# Patient Record
Sex: MALE | Race: WHITE | NOT HISPANIC OR LATINO | Employment: OTHER | URBAN - METROPOLITAN AREA
[De-identification: names, ages, dates, MRNs, and addresses within clinical notes are randomized per-mention and may not be internally consistent; named-entity substitution may affect disease eponyms.]

---

## 2017-02-06 ENCOUNTER — OFFICE VISIT (OUTPATIENT)
Dept: URGENT CARE | Facility: MEDICAL CENTER | Age: 62
End: 2017-02-06
Payer: COMMERCIAL

## 2017-02-06 PROCEDURE — 87430 STREP A AG IA: CPT

## 2017-02-06 PROCEDURE — 99203 OFFICE O/P NEW LOW 30 MIN: CPT

## 2018-01-20 ENCOUNTER — OFFICE VISIT (OUTPATIENT)
Dept: URGENT CARE | Facility: MEDICAL CENTER | Age: 63
End: 2018-01-20
Payer: COMMERCIAL

## 2018-01-20 LAB
BILIRUB UR QL STRIP: NEGATIVE
CLARITY UR: NORMAL
COLOR UR: YELLOW
GLUCOSE (HISTORICAL): NEGATIVE
HGB UR QL STRIP.AUTO: NEGATIVE
KETONES UR STRIP-MCNC: NEGATIVE MG/DL
LEUKOCYTE ESTERASE UR QL STRIP: NEGATIVE
NITRITE UR QL STRIP: NEGATIVE
PH UR STRIP.AUTO: 6 [PH]
PROT UR STRIP-MCNC: NEGATIVE MG/DL
SP GR UR STRIP.AUTO: 1.02
UROBILINOGEN UR QL STRIP.AUTO: 0.2

## 2018-01-20 PROCEDURE — 99213 OFFICE O/P EST LOW 20 MIN: CPT

## 2018-01-20 PROCEDURE — 81002 URINALYSIS NONAUTO W/O SCOPE: CPT

## 2018-01-22 NOTE — PROGRESS NOTES
Assessment   1  Abdominal pain, LLQ (left lower quadrant) (603 05) (R10 32)    Plan   Abdominal pain    · Urine Dip Non-Automated- POC; Status:Resulted - Requires Verification,Retrospective    By Protocol Authorization;   Done: [id number] 06:34PM    Discussion/Summary   Discussion Summary:    1  LLQ abdominal pain go directly to the ER for bloodwork, CT scan and further work-up directly to [facility] per your request     Understands and agrees with treatment plan: The treatment plan was reviewed with the patient/guardian  The patient/guardian understands and agrees with the treatment plan      Chief Complaint   1  Abdominal Pain  Chief Complaint Free Text Note Form: Pt with complaints of pain across lower abdominal area that goes around to back  for past 24 hours  Denies nausea, vomiting or diarrhea  Describes pain as cramping  Currently rates pain 1/10  History of Present Illness   HPI: The patient presents today for an evaluation of lower abdominal pain that started last night  The patient states that pain has been coming and going throughout the day  The patient states that the pain is not present currently and he rates his pain as a 0/10  The patient has not tried any medications at home  Patient has a normal bowel movement this morning  Hospital Based Practices Required Assessment:      Pain Assessment      the patient states they have pain  The pain is located in the lower abdominal pain  The patient describes the pain as dull  (on a scale of 0 to 10, the patient rates the pain at 1 )      Abuse And Domestic Violence Screen   Domestic violence screen not done today  Reason DV Screen not done: wife in room       Depression And Suicide Screen  Suicide screen not done today  -- Reason suicide screen not done: wife in room  Prefered Language is  english  Primary Language is  english  Review of Systems   Focused-Male:      Constitutional: no fever-- and-- no chills  Respiratory: no shortness of breath  Gastrointestinal: abdominal pain, but-- no nausea,-- no vomiting-- and-- no diarrhea  Genitourinary: no dysuria  ROS Reviewed:    ROS reviewed  Active Problems   1  Exposure to strep throat (V01 89) (Z20 818)    Past Medical History   1  History of hypertension (V12 59) (Z86 79)  Active Problems And Past Medical History Reviewed: The active problems and past medical history were reviewed and updated today  Family History   Family History Reviewed: The family history was reviewed and updated today  Social History    · No alcohol use   · Nonsmoker (V49 89) (Z78 9)  Social History Reviewed: The social history was reviewed and updated today  The social history was reviewed and is unchanged  Surgical History   Surgical History Reviewed: The surgical history was reviewed and updated today  Current Meds    1  Amlodipine Besy-Benazepril HCl - 5-10 MG Oral Capsule; Therapy: (Recorded:[id number]) to Recorded   2  Diovan 40 MG Oral Tablet; Therapy: (Recorded:[id number]) to Recorded   3  Lovaza 1 GM Oral Capsule; Therapy: (Recorded:[id number]) to Recorded  Medication List Reviewed: The medication list was reviewed and updated today  Allergies   1  Penicillins    Vitals   Signs   Recorded: [id number] 06:28PM   Temperature: 97 F  Heart Rate: 60  Respiration: 16  Systolic: 030  Diastolic: 80  Height: 5 ft 10 in  Weight: 186 lb   BMI Calculated: 26 69  BSA Calculated: 2 02  O2 Saturation: 100  Pain Scale: 1    Physical Exam        Constitutional      General appearance: No acute distress, well appearing and well nourished  Pulmonary      Respiratory effort: No increased work of breathing or signs of respiratory distress  Auscultation of lungs: Clear to auscultation  Cardiovascular      Auscultation of heart: Normal rate and rhythm, normal S1 and S2, without murmurs         Abdomen      Abdomen: Abnormal   There was moderate tenderness in the suprapubic area-- and-- in the left lower quadrant  No rebound tenderness  No guarding        Musculoskeletal      Gait and station: Normal        Results/Data   Urine Dip Non-Automated- POC [id number] 06:34PM Anthony Powers      Test Name Result Flag Reference   Color Yellow     Clarity Transparent     Leukocytes negative     Nitrite negative     Blood negative     Bilirubin negative     Urobilinogen 0 2     Protein negative     Ph 6 0     Specific Gravity 1 020     Ketone negative     Glucose negative     Color Yellow     Clarity Transparent     Leukocytes negative     Nitrite negative     Blood negative     Bilirubin negative     Urobilinogen 0 2     Protein negative     Ph 6 0     Specific Gravity 1 020     Ketone negative     Glucose negative                Signatures    Electronically signed by : SNOW Reyes; Jan 20 2018  7:08PM EST                       (Author)     Electronically signed by : DAYANNA Parra ; Jan 21 2018  3:52PM EST

## 2021-02-09 ENCOUNTER — APPOINTMENT (OUTPATIENT)
Dept: RADIOLOGY | Facility: MEDICAL CENTER | Age: 66
End: 2021-02-09
Payer: MEDICARE

## 2021-02-09 ENCOUNTER — OFFICE VISIT (OUTPATIENT)
Dept: URGENT CARE | Facility: MEDICAL CENTER | Age: 66
End: 2021-02-09
Payer: MEDICARE

## 2021-02-09 VITALS
RESPIRATION RATE: 18 BRPM | DIASTOLIC BLOOD PRESSURE: 92 MMHG | HEART RATE: 57 BPM | TEMPERATURE: 97.5 F | OXYGEN SATURATION: 99 % | SYSTOLIC BLOOD PRESSURE: 137 MMHG

## 2021-02-09 DIAGNOSIS — M54.2 NECK PAIN: Primary | ICD-10-CM

## 2021-02-09 DIAGNOSIS — M25.511 ACUTE PAIN OF RIGHT SHOULDER: ICD-10-CM

## 2021-02-09 DIAGNOSIS — M25.512 ACUTE PAIN OF LEFT SHOULDER: ICD-10-CM

## 2021-02-09 DIAGNOSIS — M54.2 NECK PAIN: ICD-10-CM

## 2021-02-09 PROCEDURE — G0463 HOSPITAL OUTPT CLINIC VISIT: HCPCS | Performed by: FAMILY MEDICINE

## 2021-02-09 PROCEDURE — 73030 X-RAY EXAM OF SHOULDER: CPT

## 2021-02-09 PROCEDURE — 99213 OFFICE O/P EST LOW 20 MIN: CPT | Performed by: FAMILY MEDICINE

## 2021-02-09 PROCEDURE — 72040 X-RAY EXAM NECK SPINE 2-3 VW: CPT

## 2021-02-09 RX ORDER — ROSUVASTATIN CALCIUM 20 MG/1
20 TABLET, COATED ORAL DAILY
COMMUNITY
Start: 2020-09-30

## 2021-02-09 RX ORDER — TADALAFIL 10 MG/1
10 TABLET ORAL
COMMUNITY
End: 2021-07-26 | Stop reason: SDUPTHER

## 2021-02-09 RX ORDER — AMLODIPINE BESYLATE AND BENAZEPRIL HYDROCHLORIDE 5; 10 MG/1; MG/1
CAPSULE ORAL
COMMUNITY

## 2021-02-09 RX ORDER — FEXOFENADINE HCL 180 MG/1
180 TABLET ORAL DAILY
COMMUNITY

## 2021-02-09 RX ORDER — OMEGA-3-ACID ETHYL ESTERS 1 G/1
CAPSULE, LIQUID FILLED ORAL
COMMUNITY

## 2021-02-09 RX ORDER — METOPROLOL SUCCINATE 50 MG/1
50 TABLET, EXTENDED RELEASE ORAL
COMMUNITY
Start: 2020-08-21 | End: 2021-12-03

## 2021-02-09 RX ORDER — VALSARTAN 40 MG/1
TABLET ORAL
COMMUNITY

## 2021-02-09 NOTE — PROGRESS NOTES
[facility]        NAME: Hardik Mera is a 72 y o  male  : 1955    MRN: [id number]  DATE: 2021  TIME: 1:46 PM    Assessment and Plan   Neck pain [M54 2]  1  Neck pain     2  Acute pain of right shoulder     3  Acute pain of left shoulder       X-rays of right and left shoulder - No acute fractures or dislocations pending radiology report  X-rays of neck-no acute fractures  Osteoarthritis of cervical spine noted  Pending radiology report  Patient Instructions     Patient was educated on right and left shoulder pain  Patient was educated on taking tylenol for the pain  Patient was told if pain persist to follow up with PCP or orthopedic physician  Patient was told if he begins to have chest pain, shortness of breath or headaches to go to ED  Chief Complaint     Chief Complaint   Patient presents with    Shoulder Injury     Patient c/o bilateral shoulder and neck pain after he slipped shovling snow this morning  History of Present Illness       Patient is a 72year old male who was shoveling today when he slipped on ice and fell directly backwards  Patient reports after the fall he felt B/L shoulder pain and neck pain  Denies hitting his head or losing consciousness  Admits history of Right shoulder rotator cuff tendonitis and history of left shoulder dislocations 10 years ago  Denies any history of neck pain  Admits mild nausea and blurred vision  Denies chest pain, shortness of breath, and headache  Patient admits taking Percocet with minimal relief  Patient has not tried icing or using heat  Shoulder Injury         Review of Systems   Review of Systems   Constitutional: Negative  HENT: Negative  Eyes: Negative  Respiratory: Negative  Cardiovascular: Negative  Gastrointestinal: Negative  Genitourinary: Negative  Musculoskeletal: Positive for arthralgias, neck pain and neck stiffness  Right and left shoulder pain      Neck pain   Skin: Negative  Neurological: Positive for dizziness and headaches  Psychiatric/Behavioral: Negative  Current Medications       Current Outpatient Medications:     apixaban (ELIQUIS) 5 mg, Take 5 mg by mouth 2 (two) times a day, Disp: , Rfl:     metoprolol succinate (TOPROL-XL) 50 mg 24 hr tablet, Take 50 mg by mouth, Disp: , Rfl:     rosuvastatin (CRESTOR) 20 MG tablet, Take 20 mg by mouth daily, Disp: , Rfl:     amLODIPine-benazepril (LOTREL 5-10) 5-10 MG per capsule, Take by mouth, Disp: , Rfl:     fexofenadine (ALLEGRA) 180 MG tablet, Take 180 mg by mouth daily, Disp: , Rfl:     omega-3-acid ethyl esters (Lovaza) 1 g capsule, Take by mouth, Disp: , Rfl:     tadalafil (CIALIS) 10 MG tablet, Take 10 mg by mouth, Disp: , Rfl:     valsartan (Diovan) 40 mg tablet, Take by mouth, Disp: , Rfl:     Current Allergies     Allergies as of 02/09/2021 - never reviewed   Allergen Reaction Noted    Other Other (See Comments) 03/19/2015    Penicillins Other (See Comments) 11/23/2016            The following portions of the patient's history were reviewed and updated as appropriate: allergies, current medications, past family history, past medical history, past social history, past surgical history and problem list      No past medical history on file  No past surgical history on file  No family history on file  Medications have been verified  Objective   /92   Pulse 57   Temp 97 5 °F (36 4 °C)   Resp 18   SpO2 99%   No LMP for male patient  Physical Exam     Physical Exam  Constitutional:       Appearance: Normal appearance  He is normal weight  HENT:      Head: Normocephalic  Eyes:      Pupils: Pupils are equal, round, and reactive to light  Neck:      Musculoskeletal: Normal range of motion  Cardiovascular:      Rate and Rhythm: Normal rate and regular rhythm  Heart sounds: Normal heart sounds     Pulmonary:      Effort: Pulmonary effort is normal       Breath sounds: Normal breath sounds  Abdominal:      Palpations: Abdomen is soft  Musculoskeletal:      Comments: Significantly limited neck range of motion  Pain over right clavicle  No pain over left clavicle  No pain over left shoulder joint  Mild pain over right shoulder joint  Right shoulder range of motion decreased and limited due to pain  Left shoulder pain intact  Positive Empty can test in right shoulder  Negative empty can test in left shoulder  No pain over left and right elbow joint  NO pain with resisted left and right elbow flexion and extension   strength intact in B/L hand  NO pain over cervical, thoracic or lumbar spine  Pain with palpation over right paraspinals  Patient is neurovascularly intact  NO pain with rib palpation  Skin:     Comments: NO abrasion noted over B/L clavicles    Neurological:      Mental Status: He is alert and oriented to person, place, and time     Psychiatric:         Mood and Affect: Mood normal          Behavior: Behavior normal

## 2021-02-09 NOTE — PATIENT INSTRUCTIONS
Patient was educated on right and left shoulder pain  Patient was educated on to take tylenol for the pain  Patient was educated to take anti-inflammatories with food  Patient was told if pain persist to follow up with PCP or orthopedic physician  Patient was told if he begins to have chest pain, shortness of breath or headaches to go to ED  Neck Pain   WHAT YOU NEED TO KNOW:   You may have sudden neck pain that increases quickly  You may instead feel pain build slowly over time  Neck pain may go away in a few days or weeks, or it may continue for months  The pain may come and go, or be worse with certain movements  The pain may be only in your neck, or it may move to your arms, back, or shoulders  You may also have pain that starts in another body area and moves to your neck  Some types of neck pain are permanent  DISCHARGE INSTRUCTIONS:   Return to the emergency department if:   · You have an injury that causes neck pain and shooting pain down your arms or legs  · Your neck pain suddenly becomes severe  · You have neck pain along with numbness, tingling, or weakness in your arms or legs  · You have a stiff neck, a headache, and a fever  Contact your healthcare provider if:   · You have new or worsening symptoms  · Your symptoms continue even after treatment  · You have questions or concerns about your condition or care  Medicines: You may need any of the following:  · NSAIDs  , such as ibuprofen, help decrease swelling, pain, and fever  This medicine is available without a doctor's order  Ask your healthcare provider which medicine to take and how often to take it  Follow directions  NSAIDs can cause stomach bleeding or kidney problems if not taken correctly  If you take blood thinner medicine, always ask if NSAIDs are safe for you  · Acetaminophen  helps decrease pain and fever  Ask your healthcare provider how much to take and how often to take it  Follow directions  Acetaminophen can cause liver damage if not taken correctly  · Steroid medicine  may be used to reduce inflammation  This can help relieve pain caused by swelling  · Take your medicine as directed  Contact your healthcare provider if you think your medicine is not helping or if you have side effects  Tell him or her if you are allergic to any medicine  Keep a list of the medicines, vitamins, and herbs you take  Include the amounts, and when and why you take them  Bring the list or the pill bottles to follow-up visits  Carry your medicine list with you in case of an emergency  Manage or prevent neck pain:   · Rest your neck as directed  Do not make sudden movements, such as turning your head quickly  Your healthcare provider may recommend you wear a cervical collar for a short time  The collar will prevent you from moving your head  This will give your neck time to heal if an injury is causing your neck pain  Ask your healthcare provider when you can return to sports or other normal daily activities  · Apply heat as directed  Heat helps relieve pain and swelling  Use a heat wrap, or soak a small towel in warm water  Wring out the extra water  Apply the heat wrap or towel for 20 minutes every hour, or as directed  · Apply ice as directed  Ice helps relieve pain and swelling, and can help prevent tissue damage  Use an ice pack, or put ice in a bag  Cover the ice pack or back with a towel before you apply it to your neck  Apply the ice pack or ice for 15 minutes every hour, or as directed  Your healthcare provider can tell you how often to apply ice  · Do neck exercises as directed  Neck exercises help strengthen the muscles and increase range of motion  Your healthcare provider will tell you which exercises are right for you   He may give you instructions, or he may recommend that you work with a physical therapist  Your healthcare provider or therapist can make sure you are doing the exercises correctly  · Maintain good posture  Try to keep your head and shoulders lifted when you sit  If you work in front of a computer, make sure the monitor is at the right level  You should not need to look up down to see the screen  You should also not have to lean forward to be able to read what is on the screen  Make sure your keyboard, mouse, and other computer items are placed where you do not have to extend your shoulder to reach them  Get up often if you work in front of a computer or sit for long periods of time  Stretch or walk around to keep your neck muscles loose  Follow up with your healthcare provider as directed: Your healthcare provider may refer you to a specialist if your pain does not get better with treatment  Write down your questions so you remember to ask them during your visits  © Copyright [address] Information is for End User's use only and may not be sold, redistributed or otherwise used for commercial purposes  All illustrations and images included in CareNotes® are the copyrighted property of A D [employer] M , Inc  or [facility] [employer]   The above information is an  only  It is not intended as medical advice for individual conditions or treatments  Talk to your doctor, nurse or pharmacist before following any medical regimen to see if it is safe and effective for you  Shoulder Pain   WHAT YOU NEED TO KNOW:   Shoulder pain is a common problem that can affect your daily activities  Pain can be caused by a problem within your shoulder, such as soreness of a tendon or bursa  A tendon is a cord of tough tissue that connects your muscles to your bones  The bursa is a fluid-filled sac that acts as a cushion between a bone and a tendon  Shoulder pain may also be caused by pain that spreads to your shoulder from another part of your body  DISCHARGE INSTRUCTIONS:   Return to the emergency department if:   · You have severe pain      · You cannot move your arm or shoulder  · You have numbness or tingling in your shoulder or arm  Contact your healthcare provider if:   · Your pain gets worse or does not go away with treatment  · You have trouble moving your arm or shoulder  · You have questions or concerns about your condition or care  Medicines: You may need any of the following:  · Acetaminophen  decreases pain and fever  It is available without a doctor's order  Ask how much to take and how often to take it  Follow directions  Read the labels of all other medicines you are using to see if they also contain acetaminophen, or ask your doctor or pharmacist  Acetaminophen can cause liver damage if not taken correctly  Do not use more than 4 grams (4,000 milligrams) total of acetaminophen in one day  · NSAIDs , such as ibuprofen, help decrease swelling, pain, and fever  This medicine is available with or without a doctor's order  NSAIDs can cause stomach bleeding or kidney problems in certain people  If you take blood thinner medicine, always ask your healthcare provider if NSAIDs are safe for you  Always read the medicine label and follow directions  · Take your medicine as directed  Contact your healthcare provider if you think your medicine is not helping or if you have side effects  Tell him of her if you are allergic to any medicine  Keep a list of the medicines, vitamins, and herbs you take  Include the amounts, and when and why you take them  Bring the list or the pill bottles to follow-up visits  Carry your medicine list with you in case of an emergency  Manage your symptoms:   · Apply ice  on your shoulder for 20 to 30 minutes every 2 hours or as directed  Use an ice pack, or put crushed ice in a plastic bag  Cover it with a towel before you apply it to your shoulder  Ice helps prevent tissue damage and decreases swelling and pain  · Apply heat if ice does not help your symptoms    Apply heat on your shoulder for 20 to 30 minutes every 2 hours for as many days as directed  Heat helps decrease pain and muscle spasms  · Limit activities as directed  Try to avoid repeated overhead movements  · Go to physical or occupational therapy as directed  A physical therapist teaches you exercises to help improve movement and strength, and to decrease pain  An occupational therapist teaches you skills to help with your daily activities  Prevent shoulder pain:   · Maintain a good range of motion in your shoulder  Ask your healthcare provider which exercises you should do on a regular basis after you have healed  · Stretch and strengthen your shoulder  Use proper technique during exercises and sports  Follow up with your healthcare provider or orthopedist as directed:  Write down your questions so you remember to ask them during your visits  © Copyright [address] Information is for End User's use only and may not be sold, redistributed or otherwise used for commercial purposes  All illustrations and images included in CareNotes® are the copyrighted property of [employer] D [employer] M , Inc  or [facility] [employer]   The above information is an  only  It is not intended as medical advice for individual conditions or treatments  Talk to your doctor, nurse or pharmacist before following any medical regimen to see if it is safe and effective for you

## 2021-03-01 ENCOUNTER — OFFICE VISIT (OUTPATIENT)
Dept: URGENT CARE | Facility: MEDICAL CENTER | Age: 66
End: 2021-03-01
Payer: MEDICARE

## 2021-03-01 VITALS
RESPIRATION RATE: 18 BRPM | HEIGHT: 68 IN | WEIGHT: 185 LBS | HEART RATE: 68 BPM | OXYGEN SATURATION: 98 % | TEMPERATURE: 96.4 F | BODY MASS INDEX: 28.04 KG/M2

## 2021-03-01 DIAGNOSIS — J02.9 PHARYNGITIS, UNSPECIFIED ETIOLOGY: Primary | ICD-10-CM

## 2021-03-01 DIAGNOSIS — Z20.822 ENCOUNTER FOR LABORATORY TESTING FOR COVID-19 VIRUS: ICD-10-CM

## 2021-03-01 LAB
FLUAV RNA NPH QL NAA+PROBE: NORMAL
FLUBV RNA NPH QL NAA+PROBE: NORMAL
RSV RNA NPH QL NAA+PROBE: NORMAL
S PYO AG THROAT QL: NEGATIVE

## 2021-03-01 PROCEDURE — G0463 HOSPITAL OUTPT CLINIC VISIT: HCPCS | Performed by: PHYSICIAN ASSISTANT

## 2021-03-01 PROCEDURE — 87880 STREP A ASSAY W/OPTIC: CPT | Performed by: PHYSICIAN ASSISTANT

## 2021-03-01 PROCEDURE — U0005 INFEC AGEN DETEC AMPLI PROBE: HCPCS | Performed by: PHYSICIAN ASSISTANT

## 2021-03-01 PROCEDURE — 87070 CULTURE OTHR SPECIMN AEROBIC: CPT | Performed by: PHYSICIAN ASSISTANT

## 2021-03-01 PROCEDURE — U0003 INFECTIOUS AGENT DETECTION BY NUCLEIC ACID (DNA OR RNA); SEVERE ACUTE RESPIRATORY SYNDROME CORONAVIRUS 2 (SARS-COV-2) (CORONAVIRUS DISEASE [COVID-19]), AMPLIFIED PROBE TECHNIQUE, MAKING USE OF HIGH THROUGHPUT TECHNOLOGIES AS DESCRIBED BY CMS-2020-01-R: HCPCS | Performed by: PHYSICIAN ASSISTANT

## 2021-03-01 PROCEDURE — 99212 OFFICE O/P EST SF 10 MIN: CPT | Performed by: PHYSICIAN ASSISTANT

## 2021-03-01 PROCEDURE — 87631 RESP VIRUS 3-5 TARGETS: CPT | Performed by: PHYSICIAN ASSISTANT

## 2021-03-01 RX ORDER — CLINDAMYCIN HYDROCHLORIDE 300 MG/1
300 CAPSULE ORAL 4 TIMES DAILY
Qty: 40 CAPSULE | Refills: 0 | Status: SHIPPED | OUTPATIENT
Start: 2021-03-01 | End: 2021-03-11

## 2021-03-01 NOTE — PATIENT INSTRUCTIONS

## 2021-03-02 LAB — SARS-COV-2 RNA RESP QL NAA+PROBE: NEGATIVE

## 2021-03-03 LAB — BACTERIA THROAT CULT: NORMAL

## 2021-07-15 ENCOUNTER — OFFICE VISIT (OUTPATIENT)
Dept: URGENT CARE | Facility: MEDICAL CENTER | Age: 66
End: 2021-07-15
Payer: MEDICARE

## 2021-07-15 VITALS
RESPIRATION RATE: 16 BRPM | OXYGEN SATURATION: 97 % | TEMPERATURE: 96.8 F | SYSTOLIC BLOOD PRESSURE: 182 MMHG | DIASTOLIC BLOOD PRESSURE: 84 MMHG | HEART RATE: 58 BPM

## 2021-07-15 DIAGNOSIS — R30.9 PAINFUL URINATION: Primary | ICD-10-CM

## 2021-07-15 DIAGNOSIS — B35.6 TINEA CRURIS: ICD-10-CM

## 2021-07-15 LAB
SL AMB  POCT GLUCOSE, UA: NEGATIVE
SL AMB LEUKOCYTE ESTERASE,UA: NEGATIVE
SL AMB POCT BILIRUBIN,UA: NEGATIVE
SL AMB POCT BLOOD,UA: NEGATIVE
SL AMB POCT CLARITY,UA: CLEAR
SL AMB POCT COLOR,UA: YELLOW
SL AMB POCT KETONES,UA: NEGATIVE
SL AMB POCT NITRITE,UA: NEGATIVE
SL AMB POCT PH,UA: 5
SL AMB POCT SPECIFIC GRAVITY,UA: 1.02
SL AMB POCT URINE PROTEIN: NEGATIVE
SL AMB POCT UROBILINOGEN: 0.2

## 2021-07-15 PROCEDURE — 81002 URINALYSIS NONAUTO W/O SCOPE: CPT | Performed by: FAMILY MEDICINE

## 2021-07-15 PROCEDURE — G0463 HOSPITAL OUTPT CLINIC VISIT: HCPCS | Performed by: FAMILY MEDICINE

## 2021-07-15 PROCEDURE — 99213 OFFICE O/P EST LOW 20 MIN: CPT | Performed by: FAMILY MEDICINE

## 2021-07-15 RX ORDER — TERBINAFINE HYDROCHLORIDE 250 MG/1
250 TABLET ORAL DAILY
Qty: 14 TABLET | Refills: 0 | Status: SHIPPED | OUTPATIENT
Start: 2021-07-15 | End: 2021-07-29

## 2021-07-15 RX ORDER — NYSTATIN 100000 U/G
CREAM TOPICAL 2 TIMES DAILY
COMMUNITY
End: 2021-12-03 | Stop reason: ALTCHOICE

## 2021-07-15 RX ORDER — OMEPRAZOLE 20 MG/1
20 CAPSULE, DELAYED RELEASE ORAL DAILY
COMMUNITY

## 2021-07-15 NOTE — PROGRESS NOTES
[facility]        NAME: Ann Marie Jimenez is a 72 y o  male  : 1955    MRN: [id number]  DATE: July 15, 2021  TIME: 11:18 AM    Assessment and Plan   Painful urination [R30 9]  1  Painful urination  POCT urine dip   2  Tinea cruris  terbinafine (LamISIL) 250 mg tablet         Patient Instructions       Follow up with PCP in 3-5 days  Proceed to  ER if symptoms worsen  Chief Complaint     Chief Complaint   Patient presents with    Possible UTI     Patient relates started with painful urination for 4 days  Denies fever and chills  Denies flank pain and abdominal pain  C/O "jock itch" due to heat and sweating  History of Present Illness        58-year-old male here today with complaint of painful urination for the past 4 days  Denies any  Pelvic pain  No fever  He has had previous UTIs in the past many years ago  He has a known history of BPH presently  However his main concern is he has fungal infection in the groin area which she has been treating with OTC anti fungals  He was recently seen by his PCP who prescribed as 10 cream which she has started last 3 day with minimal improvement  He has tried topical Lamisil in the past and also Lotrimin with no significant improvement  He describes some mild to moderate pruritus      Review of Systems   Review of Systems   Genitourinary: Positive for dysuria  Skin: Positive for rash           Current Medications       Current Outpatient Medications:     amLODIPine-benazepril (LOTREL 5-10) 5-10 MG per capsule, Take by mouth, Disp: , Rfl:     apixaban (ELIQUIS) 5 mg, Take 5 mg by mouth 2 (two) times a day, Disp: , Rfl:     fexofenadine (ALLEGRA) 180 MG tablet, Take 180 mg by mouth daily, Disp: , Rfl:     metoprolol succinate (TOPROL-XL) 50 mg 24 hr tablet, Take 50 mg by mouth, Disp: , Rfl:     nystatin (MYCOSTATIN) cream, Apply topically 2 (two) times a day, Disp: , Rfl:     omega-3-acid ethyl esters (Lovaza) 1 g capsule, Take by mouth, Disp: , Rfl:     omeprazole (PriLOSEC) 20 mg delayed release capsule, Take 20 mg by mouth daily, Disp: , Rfl:     rosuvastatin (CRESTOR) 20 MG tablet, Take 20 mg by mouth daily, Disp: , Rfl:     tadalafil (CIALIS) 10 MG tablet, Take 10 mg by mouth, Disp: , Rfl:     terbinafine (LamISIL) 250 mg tablet, Take 1 tablet (250 mg total) by mouth daily for 14 days, Disp: 14 tablet, Rfl: 0    valsartan (Diovan) 40 mg tablet, Take by mouth (Patient not taking: Reported on 7/15/2021), Disp: , Rfl:     Current Allergies     Allergies as of 07/15/2021 - Reviewed 07/15/2021   Allergen Reaction Noted    Other Other (See Comments) 03/19/2015    Penicillins Other (See Comments) 11/23/2016            The following portions of the patient's history were reviewed and updated as appropriate: allergies, current medications, past family history, past medical history, past social history, past surgical history and problem list      History reviewed  No pertinent past medical history  History reviewed  No pertinent surgical history  No family history on file  Medications have been verified  Objective   BP (!) 182/84   Pulse 58   Temp (!) 96 8 °F (36 °C) (Tympanic)   Resp 16   SpO2 97%   No LMP for male patient  Physical Exam     Physical Exam  Abdominal:      General: Abdomen is flat  Bowel sounds are normal    Genitourinary:     Penis: Normal        Testes: Normal    Skin:     Comments: Right and left inguinal area reveals scattered erythematous macular papular lesion with elevated border left greater than right    Findings are consistent with tinea cruris

## 2021-07-26 ENCOUNTER — OFFICE VISIT (OUTPATIENT)
Dept: UROLOGY | Facility: MEDICAL CENTER | Age: 66
End: 2021-07-26
Payer: MEDICARE

## 2021-07-26 VITALS
DIASTOLIC BLOOD PRESSURE: 82 MMHG | SYSTOLIC BLOOD PRESSURE: 146 MMHG | BODY MASS INDEX: 26.37 KG/M2 | HEIGHT: 68 IN | WEIGHT: 174 LBS

## 2021-07-26 DIAGNOSIS — N30.00 ACUTE CYSTITIS WITHOUT HEMATURIA: ICD-10-CM

## 2021-07-26 DIAGNOSIS — N28.9 RENAL INSUFFICIENCY: ICD-10-CM

## 2021-07-26 DIAGNOSIS — N13.8 BPH WITH URINARY OBSTRUCTION: ICD-10-CM

## 2021-07-26 DIAGNOSIS — R97.20 ELEVATED PROSTATE SPECIFIC ANTIGEN (PSA): Primary | ICD-10-CM

## 2021-07-26 DIAGNOSIS — N40.1 BPH WITH URINARY OBSTRUCTION: ICD-10-CM

## 2021-07-26 DIAGNOSIS — N52.8 MIXED ERECTILE DYSFUNCTION: ICD-10-CM

## 2021-07-26 PROCEDURE — 99204 OFFICE O/P NEW MOD 45 MIN: CPT | Performed by: UROLOGY

## 2021-07-26 RX ORDER — SULFAMETHOXAZOLE AND TRIMETHOPRIM 400; 80 MG/1; MG/1
TABLET ORAL 2 TIMES DAILY
COMMUNITY
Start: 2021-07-21 | End: 2021-07-28

## 2021-07-26 RX ORDER — TADALAFIL 10 MG/1
10 TABLET ORAL DAILY PRN
Qty: 30 TABLET | Refills: 3 | Status: SHIPPED | OUTPATIENT
Start: 2021-07-26

## 2021-07-26 RX ORDER — KETOCONAZOLE 20 MG/G
CREAM TOPICAL DAILY
COMMUNITY
End: 2021-12-03 | Stop reason: ALTCHOICE

## 2021-07-26 RX ORDER — DIPHENOXYLATE HYDROCHLORIDE AND ATROPINE SULFATE 2.5; .025 MG/1; MG/1
1 TABLET ORAL DAILY
COMMUNITY

## 2021-07-26 RX ORDER — SULFAMETHOXAZOLE AND TRIMETHOPRIM 800; 160 MG/1; MG/1
1 TABLET ORAL EVERY 12 HOURS SCHEDULED
Qty: 14 TABLET | Refills: 0 | Status: SHIPPED | OUTPATIENT
Start: 2021-07-26 | End: 2021-08-02

## 2021-07-26 RX ORDER — MELATONIN
1000 DAILY
COMMUNITY

## 2021-07-26 NOTE — PROGRESS NOTES
HISTORY:    Patient says he has had pain on urination, not really burning, for about two weeks or so  Feels this is symptoms of his UTI which has been recurrent over the years  He says about 6-8 infections over the past five years or so  Unclear if any cultures go along with this  Recent culture was mixed skin contaminant, treated over the phone by [facility] urology, and patient wanted another opinion and treatment  Minimal BPH symptoms, when he is not having infection, good stream and control rare nocturia  Chronic mild renal insufficiency    PSA 6 1 in March 2020         ASSESSMENT / PLAN:    He is five days into a seven day course of Bactrim they supplied  He does not think he has had enough symptom relief, and he wants to have further course of that on hand  At his request I gave him another week of Bactrim, says he will decide if he wants to take it based on how he feels in the next 2-3 days    He will do another urine culture 2-3 days after finishing the Bactrim    Should have cystoscopy to see if there is a reason why he has episodes of what he feels are recurrent UTI    He also wants a refill on Cialis, which I provided  PSA 6 1, will repeat again before our cystoscopy in about two months    The following portions of the patient's history were reviewed and updated as appropriate: allergies, current medications, past family history, past medical history, past social history, past surgical history and problem list     Review of Systems   All other systems reviewed and are negative  Objective:     Physical Exam  Constitutional:       General: He is not in acute distress  Appearance: He is well-developed  He is not diaphoretic  HENT:      Head: Normocephalic and atraumatic  Eyes:      General: No scleral icterus    Pulmonary:      Effort: Pulmonary effort is normal    Genitourinary:     Comments: Penis testes normal    Prostate minimally enlarged no nodules  Skin:     Coloration: Skin is not pale  Neurological:      Mental Status: He is alert and oriented to person, place, and time  Psychiatric:         Behavior: Behavior normal          Thought Content: Thought content normal          Judgment: Judgment normal            No results found for: PSA]  No results found for: BUN  No results found for: CREATININE  No components found for: CBC      There is no problem list on file for this patient  Diagnoses and all orders for this visit:    Elevated prostate specific antigen (PSA)  -     PSA Total, Diagnostic; Future    Renal insufficiency    BPH with urinary obstruction    Acute cystitis without hematuria  -     Urine culture; Future  -     sulfamethoxazole-trimethoprim (BACTRIM DS) 800-160 mg per tablet; Take 1 tablet by mouth every 12 (twelve) hours for 7 days    Mixed erectile dysfunction  -     tadalafil (CIALIS) 10 MG tablet; Take 1 tablet (10 mg total) by mouth daily as needed for erectile dysfunction    Other orders  -     sulfamethoxazole-trimethoprim (BACTRIM) 400-80 mg per tablet; Take by mouth 2 (two) times a day  -     Probiotic Product (PROBIOTIC ADVANCED PO); Take by mouth  -     cholecalciferol (VITAMIN D3) 1,000 units tablet; Take 1,000 Units by mouth daily  -     multivitamin (THERAGRAN) TABS; Take 1 tablet by mouth daily  -     ketoconazole (NIZORAL) 2 % cream; Apply topically daily           Patient ID: Vivien Fong is a 72 y o  male        Current Outpatient Medications:     sulfamethoxazole-trimethoprim (BACTRIM) 400-80 mg per tablet, Take by mouth 2 (two) times a day, Disp: , Rfl:     amLODIPine-benazepril (LOTREL 5-10) 5-10 MG per capsule, Take by mouth, Disp: , Rfl:     apixaban (ELIQUIS) 5 mg, Take 5 mg by mouth 2 (two) times a day, Disp: , Rfl:     fexofenadine (ALLEGRA) 180 MG tablet, Take 180 mg by mouth daily, Disp: , Rfl:     metoprolol succinate (TOPROL-XL) 50 mg 24 hr tablet, Take 50 mg by mouth, Disp: , Rfl:     nystatin (MYCOSTATIN) cream, Apply topically 2 (two) times a day, Disp: , Rfl:     omega-3-acid ethyl esters (Lovaza) 1 g capsule, Take by mouth, Disp: , Rfl:     omeprazole (PriLOSEC) 20 mg delayed release capsule, Take 20 mg by mouth daily, Disp: , Rfl:     rosuvastatin (CRESTOR) 20 MG tablet, Take 20 mg by mouth daily, Disp: , Rfl:     tadalafil (CIALIS) 10 MG tablet, Take 10 mg by mouth, Disp: , Rfl:     terbinafine (LamISIL) 250 mg tablet, Take 1 tablet (250 mg total) by mouth daily for 14 days, Disp: 14 tablet, Rfl: 0    valsartan (Diovan) 40 mg tablet, Take by mouth (Patient not taking: Reported on 7/15/2021), Disp: , Rfl:     No past medical history on file  No past surgical history on file      Social History

## 2021-08-13 ENCOUNTER — OFFICE VISIT (OUTPATIENT)
Dept: URGENT CARE | Facility: MEDICAL CENTER | Age: 66
End: 2021-08-13
Payer: MEDICARE

## 2021-08-13 ENCOUNTER — APPOINTMENT (OUTPATIENT)
Dept: LAB | Facility: MEDICAL CENTER | Age: 66
End: 2021-08-13
Attending: UROLOGY
Payer: MEDICARE

## 2021-08-13 ENCOUNTER — TELEPHONE (OUTPATIENT)
Dept: UROLOGY | Facility: AMBULATORY SURGERY CENTER | Age: 66
End: 2021-08-13

## 2021-08-13 VITALS
WEIGHT: 180 LBS | TEMPERATURE: 99 F | HEIGHT: 68 IN | OXYGEN SATURATION: 98 % | SYSTOLIC BLOOD PRESSURE: 150 MMHG | HEART RATE: 66 BPM | BODY MASS INDEX: 27.28 KG/M2 | RESPIRATION RATE: 16 BRPM | DIASTOLIC BLOOD PRESSURE: 88 MMHG

## 2021-08-13 DIAGNOSIS — K64.4 SKIN TAGS, ANUS OR RECTUM: Primary | ICD-10-CM

## 2021-08-13 DIAGNOSIS — N30.00 ACUTE CYSTITIS WITHOUT HEMATURIA: ICD-10-CM

## 2021-08-13 DIAGNOSIS — R97.20 ELEVATED PROSTATE SPECIFIC ANTIGEN (PSA): ICD-10-CM

## 2021-08-13 PROCEDURE — 99213 OFFICE O/P EST LOW 20 MIN: CPT | Performed by: PHYSICIAN ASSISTANT

## 2021-08-13 PROCEDURE — G0463 HOSPITAL OUTPT CLINIC VISIT: HCPCS | Performed by: PHYSICIAN ASSISTANT

## 2021-08-13 PROCEDURE — 87086 URINE CULTURE/COLONY COUNT: CPT

## 2021-08-13 NOTE — TELEPHONE ENCOUNTER
Called pt, he already has a urine culture order in his chart, he was supposed to do a urine culture after finishing ABX  He will go today  Instructed to increase fluids, especially water  He will call his PCP or GI doctor about anal warts

## 2021-08-13 NOTE — PATIENT INSTRUCTIONS
Follow up with  referral  Proceed to  ER if symptoms worse    Skin Tags   AMBULATORY CARE:   A skin tag  is a small growth that forms on the skin  The growth hangs off the skin from a small piece of tissue called a stalk  A skin tag often grows where skin rubs against skin and causes friction  Diabetes or obesity can increase your risk for skin tags  The risk also increases with age  Skin tags are usually harmless  Signs and symptoms of a skin tag: You may have a few skin tags in the same area  This is common  A skin tag is usually the same color as your skin, or it may be a little darker  A skin tag is usually small but can be as large as 1/2 inch (1 centimeter)  Skin tags usually do not cause pain, but they can cause irritation by rubbing against your clothes or jewelry  Common areas for skin tags to grow are your underarms, between folds of skin, eyelids, or inner thighs  They can also grow on your neck or other body areas  Contact your healthcare provider if:   · The skin tag changes in size, shape, or color  · You have a rash or other skin problem around the skin tag  · The skin tag bleeds  · Your skin tag is affecting your ability to do your daily activities  · You have questions or concerns about your condition or care  Treatment  may not be needed  The skin tag will not go away on its own, but you may not notice it or be bothered by it  You can help remove a skin tag by tying a string or dental floss around the skin tag  This will cut off the blood supply to the skin tag, and it will fall off after a few days  The following may be needed if the skin tag irritates your skin:  · Cryotherapy  is a procedure used to freeze the skin tag  Your healthcare provider uses liquid nitrogen to freeze the area  · Cauterization  is a procedure used to burn the skin tag off  Your healthcare provider uses a device to burn the area  · Surgery  may be used to remove the skin tag   Do not try to cut the skin tag off by yourself with a sharp object  Skin tags can bleed heavily when they still have a blood supply  Manage or prevent skin tags:   · You can put a bandage over the skin tag to help with irritation  Change the bandage every day or if it gets wet or dirty  · Skin tags often cannot be prevented  You may be able to lower your risk by losing weight  This will reduce skin folds where skin tags tend to form  Talk to your healthcare provider about how much you should weigh  He or she can help you create a safe weight loss plan  Follow up with your healthcare provider as directed:  Write down your questions so you remember to ask them during your visits  © Copyright [employer] 2021 Information is for End User's use only and may not be sold, redistributed or otherwise used for commercial purposes  All illustrations and images included in CareNotes® are the copyrighted property of A D A M , Inc  or Shaq Graves  The above information is an  only  It is not intended as medical advice for individual conditions or treatments  Talk to your doctor, nurse or pharmacist before following any medical regimen to see if it is safe and effective for you

## 2021-08-13 NOTE — PROGRESS NOTES
[facility]        NAME: Daniel Melendez is a 72 y o  male  : 1955    MRN: [id number]  DATE: 2021  TIME: 2:38 PM    Assessment and Plan   Skin tags, anus or rectum [K64 4]  1  Skin tags, anus or rectum  Ambulatory referral to Colorectal Surgery         Patient Instructions       Follow up with  referral  Proceed to  ER if symptoms worsen  Chief Complaint     Chief Complaint   Patient presents with    Rectal Pain     Pt c/o pain in buttocks x 2 weeks  Pt states that he noticed pain when wiping  History of Present Illness        54-year-old male presents with rectal discomfort  Patient reports symptoms started 2 weeks ago and he noticed some type of rash in the rectum area  Past couple days having diarrhea  Denies any fevers or chills  No abdominal pain nausea vomiting  No blood in stools  Denies any issues with urination  Other  This is a new problem  The current episode started 1 to 4 weeks ago  The problem occurs constantly  The problem has been waxing and waning  Pertinent negatives include no abdominal pain, anorexia, chills, congestion, coughing, fever, headaches, myalgias, nausea, swollen glands, visual change or vomiting  Nothing aggravates the symptoms  He has tried nothing for the symptoms  The treatment provided no relief  Review of Systems   Review of Systems   Constitutional: Negative  Negative for chills and fever  HENT: Negative  Negative for congestion  Respiratory: Negative  Negative for cough  Cardiovascular: Negative  Gastrointestinal: Negative  Negative for abdominal pain, anorexia, nausea and vomiting  Musculoskeletal: Negative  Negative for myalgias  Skin: Negative  Neurological: Negative  Negative for headaches           Current Medications       Current Outpatient Medications:     amLODIPine-benazepril (LOTREL 5-10) 5-10 MG per capsule, Take by mouth, Disp: , Rfl:     apixaban (ELIQUIS) 5 mg, Take 5 mg by mouth 2 (two) times a day, Disp: , Rfl:     cholecalciferol (VITAMIN D3) 1,000 units tablet, Take 1,000 Units by mouth daily, Disp: , Rfl:     fexofenadine (ALLEGRA) 180 MG tablet, Take 180 mg by mouth daily, Disp: , Rfl:     ketoconazole (NIZORAL) 2 % cream, Apply topically daily, Disp: , Rfl:     metoprolol succinate (TOPROL-XL) 50 mg 24 hr tablet, Take 50 mg by mouth, Disp: , Rfl:     multivitamin (THERAGRAN) TABS, Take 1 tablet by mouth daily, Disp: , Rfl:     nystatin (MYCOSTATIN) cream, Apply topically 2 (two) times a day (Patient not taking: Reported on 7/26/2021), Disp: , Rfl:     omega-3-acid ethyl esters (Lovaza) 1 g capsule, Take by mouth, Disp: , Rfl:     omeprazole (PriLOSEC) 20 mg delayed release capsule, Take 20 mg by mouth daily (Patient not taking: Reported on 7/26/2021), Disp: , Rfl:     Probiotic Product (PROBIOTIC ADVANCED PO), Take by mouth, Disp: , Rfl:     rosuvastatin (CRESTOR) 20 MG tablet, Take 20 mg by mouth daily, Disp: , Rfl:     tadalafil (CIALIS) 10 MG tablet, Take 1 tablet (10 mg total) by mouth daily as needed for erectile dysfunction, Disp: 30 tablet, Rfl: 3    valsartan (Diovan) 40 mg tablet, Take by mouth (Patient not taking: Reported on 7/15/2021), Disp: , Rfl:     Current Allergies     Allergies as of 08/13/2021 - Reviewed 08/13/2021   Allergen Reaction Noted    Other Other (See Comments) 03/19/2015    Penicillins Other (See Comments) 11/23/2016            The following portions of the patient's history were reviewed and updated as appropriate: allergies, current medications, past family history, past medical history, past social history, past surgical history and problem list      History reviewed  No pertinent past medical history  History reviewed  No pertinent surgical history  History reviewed  No pertinent family history  Medications have been verified          Objective   /88   Pulse 66   Temp 99 °F (37 2 °C)   Resp 16   Ht 5' 8" (1 727 m)   Wt 81 6 kg (180 lb)   SpO2 98%   BMI 27 37 kg/m²   No LMP for male patient  Physical Exam     Physical Exam  Vitals and nursing note reviewed  Exam conducted with a chaperone present  Constitutional:       General: He is not in acute distress  Appearance: He is well-developed  HENT:      Head: Normocephalic and atraumatic  Right Ear: Hearing, tympanic membrane, ear canal and external ear normal       Left Ear: Hearing, tympanic membrane, ear canal and external ear normal       Nose: Nose normal       Mouth/Throat:      Pharynx: Uvula midline  No oropharyngeal exudate  Eyes:      General:         Right eye: No discharge  Left eye: No discharge  Conjunctiva/sclera: Conjunctivae normal    Cardiovascular:      Rate and Rhythm: Normal rate and regular rhythm  Heart sounds: Normal heart sounds  No murmur heard  Pulmonary:      Effort: Pulmonary effort is normal  No respiratory distress  Breath sounds: Normal breath sounds  No wheezing or rales  Abdominal:      General: Bowel sounds are normal       Palpations: Abdomen is soft  Tenderness: There is no abdominal tenderness  Genitourinary:     Comments:  Fleshy lesions noted around rectum  Non bleeding  Nonobstructing  Musculoskeletal:         General: Normal range of motion  Cervical back: Normal range of motion and neck supple  Lymphadenopathy:      Cervical: No cervical adenopathy  Skin:     General: Skin is warm and dry  Neurological:      Mental Status: He is alert and oriented to person, place, and time

## 2021-08-13 NOTE — TELEPHONE ENCOUNTER
Patient managed by Dr Jarett Fraga [facility]) patient called in stating he has a possible UTI, painful urination and patient stated he has anal warts  Patient stated he started feeling bumps about a few weeks ago   Patient can be reached at [phone]

## 2021-08-14 LAB — BACTERIA UR CULT: NORMAL

## 2021-08-16 NOTE — TELEPHONE ENCOUNTER
Call placed to patient and spoke with him  Informed him of his recent urine testing results  Pt is aware and has no further questions at this time

## 2021-08-24 PROCEDURE — 88305 TISSUE EXAM BY PATHOLOGIST: CPT | Performed by: PATHOLOGY

## 2021-08-25 ENCOUNTER — LAB REQUISITION (OUTPATIENT)
Dept: LAB | Facility: HOSPITAL | Age: 66
End: 2021-08-25
Payer: MEDICARE

## 2021-08-25 DIAGNOSIS — A63.0 ANOGENITAL (VENEREAL) WARTS: ICD-10-CM

## 2021-10-27 ENCOUNTER — TELEPHONE (OUTPATIENT)
Dept: UROLOGY | Facility: AMBULATORY SURGERY CENTER | Age: 66
End: 2021-10-27

## 2021-10-27 DIAGNOSIS — N48.89 PENILE PAIN: Primary | ICD-10-CM

## 2021-10-27 DIAGNOSIS — R30.0 DYSURIA: ICD-10-CM

## 2021-10-27 RX ORDER — PHENAZOPYRIDINE HYDROCHLORIDE 200 MG/1
200 TABLET, FILM COATED ORAL
Qty: 10 TABLET | Refills: 0 | Status: SHIPPED | OUTPATIENT
Start: 2021-10-27

## 2021-11-05 ENCOUNTER — APPOINTMENT (OUTPATIENT)
Dept: LAB | Facility: MEDICAL CENTER | Age: 66
End: 2021-11-05
Payer: MEDICARE

## 2021-11-05 DIAGNOSIS — R30.0 DYSURIA: ICD-10-CM

## 2021-11-05 LAB — PSA SERPL-MCNC: 5.7 NG/ML (ref 0–4)

## 2021-11-05 PROCEDURE — 84153 ASSAY OF PSA TOTAL: CPT

## 2021-11-08 ENCOUNTER — APPOINTMENT (OUTPATIENT)
Dept: LAB | Facility: MEDICAL CENTER | Age: 66
End: 2021-11-08
Payer: MEDICARE

## 2021-11-08 LAB
BACTERIA UR QL AUTO: ABNORMAL /HPF
BILIRUB UR QL STRIP: NEGATIVE
CLARITY UR: CLEAR
COLOR UR: YELLOW
GLUCOSE UR STRIP-MCNC: NEGATIVE MG/DL
HGB UR QL STRIP.AUTO: NEGATIVE
HYALINE CASTS #/AREA URNS LPF: ABNORMAL /LPF
KETONES UR STRIP-MCNC: NEGATIVE MG/DL
LEUKOCYTE ESTERASE UR QL STRIP: ABNORMAL
NITRITE UR QL STRIP: NEGATIVE
NON-SQ EPI CELLS URNS QL MICRO: ABNORMAL /HPF
PH UR STRIP.AUTO: 5.5 [PH]
PROT UR STRIP-MCNC: NEGATIVE MG/DL
RBC #/AREA URNS AUTO: ABNORMAL /HPF
SP GR UR STRIP.AUTO: 1.02 (ref 1–1.03)
UROBILINOGEN UR QL STRIP.AUTO: 0.2 E.U./DL
WBC #/AREA URNS AUTO: ABNORMAL /HPF

## 2021-11-08 PROCEDURE — 87086 URINE CULTURE/COLONY COUNT: CPT

## 2021-11-08 PROCEDURE — 81001 URINALYSIS AUTO W/SCOPE: CPT

## 2021-11-09 LAB — BACTERIA UR CULT: NORMAL

## 2021-11-10 ENCOUNTER — PROCEDURE VISIT (OUTPATIENT)
Dept: UROLOGY | Facility: MEDICAL CENTER | Age: 66
End: 2021-11-10
Payer: MEDICARE

## 2021-11-10 VITALS
DIASTOLIC BLOOD PRESSURE: 96 MMHG | WEIGHT: 173 LBS | HEIGHT: 68 IN | HEART RATE: 68 BPM | BODY MASS INDEX: 26.22 KG/M2 | SYSTOLIC BLOOD PRESSURE: 160 MMHG

## 2021-11-10 DIAGNOSIS — N40.1 BPH WITH URINARY OBSTRUCTION: Primary | ICD-10-CM

## 2021-11-10 DIAGNOSIS — N13.8 BPH WITH URINARY OBSTRUCTION: Primary | ICD-10-CM

## 2021-11-10 DIAGNOSIS — R30.0 DYSURIA: ICD-10-CM

## 2021-11-10 DIAGNOSIS — N32.9 LESION OF BLADDER: ICD-10-CM

## 2021-11-10 PROCEDURE — 52000 CYSTOURETHROSCOPY: CPT | Performed by: UROLOGY

## 2021-11-10 PROCEDURE — 99214 OFFICE O/P EST MOD 30 MIN: CPT | Performed by: UROLOGY

## 2021-11-10 RX ORDER — HYDROCODONE BITARTRATE AND ACETAMINOPHEN 5; 325 MG/1; MG/1
1 TABLET ORAL EVERY 6 HOURS PRN
COMMUNITY

## 2021-11-11 ENCOUNTER — TELEPHONE (OUTPATIENT)
Dept: UROLOGY | Facility: MEDICAL CENTER | Age: 66
End: 2021-11-11

## 2021-11-12 ENCOUNTER — PREP FOR PROCEDURE (OUTPATIENT)
Dept: UROLOGY | Facility: MEDICAL CENTER | Age: 66
End: 2021-11-12

## 2021-11-12 DIAGNOSIS — N32.89 BLADDER MASS: Primary | ICD-10-CM

## 2021-12-03 ENCOUNTER — OFFICE VISIT (OUTPATIENT)
Dept: UROLOGY | Facility: CLINIC | Age: 66
End: 2021-12-03
Payer: MEDICARE

## 2021-12-03 VITALS — BODY MASS INDEX: 26.98 KG/M2 | HEIGHT: 68 IN | WEIGHT: 178 LBS

## 2021-12-03 DIAGNOSIS — D41.4 BLADDER POLYP: Primary | ICD-10-CM

## 2021-12-03 PROCEDURE — 99213 OFFICE O/P EST LOW 20 MIN: CPT | Performed by: UROLOGY

## 2021-12-03 RX ORDER — TAMSULOSIN HYDROCHLORIDE 0.4 MG/1
0.4 CAPSULE ORAL
COMMUNITY
Start: 2021-11-17 | End: 2022-11-17

## 2022-07-26 ENCOUNTER — OFFICE VISIT (OUTPATIENT)
Dept: URGENT CARE | Facility: MEDICAL CENTER | Age: 67
End: 2022-07-26
Payer: MEDICARE

## 2022-07-26 VITALS
HEART RATE: 67 BPM | WEIGHT: 175 LBS | OXYGEN SATURATION: 98 % | SYSTOLIC BLOOD PRESSURE: 155 MMHG | TEMPERATURE: 97.6 F | RESPIRATION RATE: 20 BRPM | DIASTOLIC BLOOD PRESSURE: 94 MMHG | HEIGHT: 67 IN | BODY MASS INDEX: 27.47 KG/M2

## 2022-07-26 DIAGNOSIS — R10.30 LOWER ABDOMINAL PAIN: Primary | ICD-10-CM

## 2022-07-26 PROCEDURE — 99213 OFFICE O/P EST LOW 20 MIN: CPT | Performed by: EMERGENCY MEDICINE

## 2022-07-26 PROCEDURE — G0463 HOSPITAL OUTPT CLINIC VISIT: HCPCS | Performed by: EMERGENCY MEDICINE

## 2022-07-26 NOTE — PROGRESS NOTES
[facility]        NAME: Byron Ragland is a 77 y o  male  : 1955    MRN: [id number]  DATE: 2022  TIME: 2:14 PM    Assessment and Plan   Lower abdominal pain [R10 30]  1  Lower abdominal pain       Patient presents with a 3 day history of waxing and waning lower abdominal pain  On exam he is markedly tender to palpation in the suprapubic/right lower quadrant  Recommend he proceed to the nearest emergency department for further evaluation as we are unable to do any testing here in the clinic  Patient Instructions       You should proceed to the nearest Emergency Department immediately for further evaluation of your abdominal pain  Chief Complaint     Chief Complaint   Patient presents with    Abdominal Pain     Patient states he started with abd pain on   He took tums  He states he felt ok yesterday but today wok with 8/10 abd pain, he took tums and probiotic and it helped a little  He has a HX of diverticulosis  He also started today with R lower back pain  History of Present Illness       59-year-old male presents today for evaluation of abdominal pain for 3 days  Reports the pain started on   He is with chief  States yesterday he was on but the pain returned today was much worse  He states he has had 3 soft bowel movements today  No fever  Denies history of abdominal surgery  He does state that he has a history of diverticulosis but denies history of diverticulitis  Review of Systems   Review of Systems   Constitutional: Negative for chills, fatigue and fever  HENT: Negative for postnasal drip, sore throat and trouble swallowing  Respiratory: Negative for chest tightness and shortness of breath  Gastrointestinal: Positive for abdominal pain  Negative for blood in stool, diarrhea and nausea  Genitourinary: Negative for dysuria  Skin: Negative for rash  Allergic/Immunologic: Negative for immunocompromised state     Neurological: Negative for dizziness, light-headedness and headaches           Current Medications       Current Outpatient Medications:     amLODIPine-benazepril (LOTREL 5-10) 5-10 MG per capsule, Take by mouth, Disp: , Rfl:     apixaban (ELIQUIS) 5 mg, Take 5 mg by mouth 2 (two) times a day, Disp: , Rfl:     cholecalciferol (VITAMIN D3) 1,000 units tablet, Take 1,000 Units by mouth daily, Disp: , Rfl:     fexofenadine (ALLEGRA) 180 MG tablet, Take 180 mg by mouth daily, Disp: , Rfl:     HYDROcodone-acetaminophen (NORCO) 5-325 mg per tablet, Take 1 tablet by mouth every 6 (six) hours as needed for pain, Disp: , Rfl:     metoprolol succinate (TOPROL-XL) 50 mg 24 hr tablet, Take 50 mg by mouth, Disp: , Rfl:     multivitamin (THERAGRAN) TABS, Take 1 tablet by mouth daily, Disp: , Rfl:     omega-3-acid ethyl esters (Lovaza) 1 g capsule, Take by mouth (Patient not taking: Reported on 12/3/2021 ), Disp: , Rfl:     omeprazole (PriLOSEC) 20 mg delayed release capsule, Take 20 mg by mouth daily  , Disp: , Rfl:     phenazopyridine (PYRIDIUM) 200 mg tablet, Take 1 tablet (200 mg total) by mouth 3 (three) times a day with meals (Patient not taking: Reported on 11/10/2021 ), Disp: 10 tablet, Rfl: 0    Probiotic Product (PROBIOTIC ADVANCED PO), Take by mouth, Disp: , Rfl:     rosuvastatin (CRESTOR) 20 MG tablet, Take 20 mg by mouth daily, Disp: , Rfl:     tadalafil (CIALIS) 10 MG tablet, Take 1 tablet (10 mg total) by mouth daily as needed for erectile dysfunction, Disp: 30 tablet, Rfl: 3    tamsulosin (FLOMAX) 0 4 mg, Take 0 4 mg by mouth (Patient not taking: Reported on 7/26/2022), Disp: , Rfl:     valsartan (Diovan) 40 mg tablet, Take by mouth  , Disp: , Rfl:     Current Allergies     Allergies as of 07/26/2022 - Reviewed 07/26/2022   Allergen Reaction Noted    Other Other (See Comments) 03/19/2015    Tamsulosin Anaphylaxis 12/20/2021    Penicillins Other (See Comments) 11/23/2016            The following portions of the patient's history were reviewed and updated as appropriate: allergies, current medications, past family history, past medical history, past social history, past surgical history and problem list      No past medical history on file  History reviewed  No pertinent surgical history  History reviewed  No pertinent family history  Medications have been verified  Objective   /94   Pulse 67   Temp 97 6 °F (36 4 °C)   Resp 20   Ht 5' 7" (1 702 m)   Wt 79 4 kg (175 lb)   SpO2 98%   BMI 27 41 kg/m²        Physical Exam     Physical Exam  Vitals and nursing note reviewed  Constitutional:       Appearance: Normal appearance  He is not ill-appearing  HENT:      Head: Normocephalic and atraumatic  Right Ear: Tympanic membrane, ear canal and external ear normal       Left Ear: Tympanic membrane, ear canal and external ear normal       Nose: Nose normal       Mouth/Throat:      Mouth: Mucous membranes are moist    Eyes:      Extraocular Movements: Extraocular movements intact  Pupils: Pupils are equal, round, and reactive to light  Cardiovascular:      Rate and Rhythm: Normal rate and regular rhythm  Pulses: Normal pulses  Pulmonary:      Effort: Pulmonary effort is normal       Breath sounds: Normal breath sounds  Abdominal:      Tenderness: There is abdominal tenderness in the right lower quadrant, periumbilical area and suprapubic area  There is guarding and rebound  Musculoskeletal:      Cervical back: Normal range of motion  Skin:     General: Skin is warm and dry  Capillary Refill: Capillary refill takes less than 2 seconds  Neurological:      General: No focal deficit present  Mental Status: He is alert and oriented to person, place, and time     Psychiatric:         Mood and Affect: Mood normal          Behavior: Behavior normal

## 2025-05-27 ENCOUNTER — TELEPHONE (OUTPATIENT)
Age: 70
End: 2025-05-27

## 2025-05-27 NOTE — TELEPHONE ENCOUNTER
The patient called to make a new patient appointment for a second opinion regarding CKD4. Our guide has the patient needs to be seen within 30 days and to send to clinical office pool. The patient is requesting Dr. Avendaño only as their sons went to school together. He would prefer [place] or [place] locations. He is currently being seen by  nephrology Dr. Crenshaw last seen in April and does have an upcoming appointment in July which he stated he will keep until he can be seen at our office. Recent labs are in his epic chart. He did make an appointment for 9-2-25.

## 2025-05-28 NOTE — TELEPHONE ENCOUNTER
Patient calling in regard to June appointment offered    June appointment was no longer available. Patient did get moved from September to July.    Requesting to stay on cancellation list.

## 2025-07-21 ENCOUNTER — OFFICE VISIT (OUTPATIENT)
Dept: NEPHROLOGY | Facility: CLINIC | Age: 70
End: 2025-07-21
Payer: COMMERCIAL

## 2025-07-21 VITALS
BODY MASS INDEX: 25.16 KG/M2 | HEIGHT: 68 IN | SYSTOLIC BLOOD PRESSURE: 124 MMHG | WEIGHT: 166 LBS | HEART RATE: 68 BPM | DIASTOLIC BLOOD PRESSURE: 70 MMHG

## 2025-07-21 DIAGNOSIS — N18.9 CHRONIC KIDNEY DISEASE, UNSPECIFIED CKD STAGE: Primary | ICD-10-CM

## 2025-07-21 PROCEDURE — 99204 OFFICE O/P NEW MOD 45 MIN: CPT | Performed by: INTERNAL MEDICINE

## 2025-07-21 RX ORDER — NYSTATIN AND TRIAMCINOLONE ACETONIDE 100000; 1 [USP'U]/G; MG/G
1 CREAM TOPICAL 2 TIMES DAILY
COMMUNITY
Start: 2024-09-06 | End: 2025-09-06

## 2025-07-21 RX ORDER — SPIRONOLACTONE 25 MG/1
25 TABLET ORAL DAILY
COMMUNITY
Start: 2025-06-30

## 2025-07-21 RX ORDER — OXYCODONE HYDROCHLORIDE 5 MG/1
5 TABLET ORAL
COMMUNITY

## 2025-07-21 RX ORDER — FAMOTIDINE 20 MG/1
TABLET, FILM COATED ORAL
COMMUNITY
Start: 2025-05-20

## 2025-07-21 RX ORDER — CHLORTHALIDONE 25 MG/1
25 TABLET ORAL DAILY
COMMUNITY
Start: 2024-08-12 | End: 2025-08-12

## 2025-07-21 RX ORDER — FLUTICASONE PROPIONATE 50 MCG
SPRAY, SUSPENSION (ML) NASAL
COMMUNITY
Start: 2025-04-24

## 2025-07-21 RX ORDER — TRIAMCINOLONE ACETONIDE 1 MG/G
CREAM TOPICAL
COMMUNITY
Start: 2025-07-02

## 2025-07-21 RX ORDER — KETOCONAZOLE 20 MG/G
CREAM TOPICAL
COMMUNITY
Start: 2025-06-30

## 2025-07-21 RX ORDER — GENTAMICIN SULFATE 1 MG/G
OINTMENT TOPICAL
COMMUNITY
Start: 2025-06-13

## 2025-07-21 NOTE — LETTER
2025     Aj Justice MD  [address] [facility]  [address] [place] [id number]    Patient: Brayn Meza   YOB: 1955   Date of Visit: 2025       Dear Dr. Aj Justice MD:    Thank you for referring Bryan Meza to me for evaluation. Below are my notes for this consultation.    If you have questions, please do not hesitate to call me. I look forward to following your patient along with you.         Sincerely,        Marcelino Avendaño MD        CC: No Recipients    Marcelino Avendaño MD  2025  4:12 PM  Sign when Signing Visit  Name: Bryan Meza      : 1955      MRN: [id number]  Encounter Provider: Marcelino Avendaño MD  Encounter Date: 2025   Encounter department: [facility] [place]  :  Assessment & Plan  Chronic kidney disease, unspecified CKD stage  Lab Results   Component Value Date    EGFR 19 (L) 2025    EGFR 19 (L) 05/15/2025    EGFR 15 (L) 2025    CREATININE 3.33 (H) 2025    CREATININE 3.33 (H) 05/15/2025    CREATININE 4.1 (H) 2025     The patient has chronic kidney disease and has been followed by a nephrologist for 15 years.  In terms of family history there was one young male relative who ended up on dialysis but unclear etiology.  He also was told a lot of his life by his mother that there was kidney disease in the family.  The patient has been monitored and appears over the last 5-6 years creatinine has been increasing.  When I look at things it does not appear that he has polycystic kidney disease by the comments on the CAT scans.  It does not seem that he has Alport syndrome as that was tested genetically by his other nephrologist and it was negative and again there is not a strong history of male relatives with end-stage renal disease.  There are some rare autosomal recessive interstitial kidney diseases but again I cannot definitively say he has that.  He is always had no significant proteinuria so unfortunately his  creatinine has progressed and I really do not have much to offer in terms of insight into the exact etiology and it would just be a lot of discussion and theoretical causes.  On imaging he had lobulated kidneys but that could be normal variant.  He empties his bladder well and says that urology they checked this and he has no urine retention.    I did discuss with him that things have been monitored and again given lack of proteinuria I do not feel a kidney biopsy would help at this time or would have helped in the past as most aggressive intrinsic diseases cause heavy proteinuria.  I did encourage him to consider transplant evaluation if he is interested.  His blood pressure was well-controlled he had no volume overload and felt well.      At this point he is going to continue to monitor and follow-up with his primary nephrologist.  His most recent labs showed a creatinine of 3.3 which is a baseline potassium was 6.2.  He says that the doctor stopped his spironolactone and will be repeating this and his primary nephrologist is addressing this.  Most recent urine protein to creatinine ratio was negligible as well.    I told him to call me if he has any questions in the future but again he is going to follow-up with his primary nephrologist regarding long-term care as well as recent labs.             History of Present Illness  HPI  Bryan Meza is a 69 y.o. male who presents for first visit.  The patient states he has been following with nephrology for 15 years and he has had progression and he really wanted to seek out another opinion to see if there was any other thing he could do to help slow the progression.  He looks well today reports no specific complaints and was a pleasure to meet with.  History obtained from: patient    Review of Systems   Constitutional:  Negative for appetite change, diaphoresis and fever.   HENT: Negative.     Eyes: Negative.    Respiratory:  Negative for cough, chest tightness and  shortness of breath.    Cardiovascular:  Negative for chest pain and leg swelling.   Gastrointestinal:  Negative for abdominal pain, diarrhea, nausea and vomiting.   Genitourinary: Negative.         Empties bladder completely.   Psychiatric/Behavioral:  Negative for agitation, behavioral problems and confusion.      Medical History Reviewed by provider this encounter:     .  Past Medical History  Past Medical History[1] No history of diabetes, stroke, kidney stones, liver disease, lung disease.  Past Surgical History[2]  Family History[3]   reports that he has never smoked. He has never used smokeless tobacco. He reports current drug use. Drug: Marijuana. He reports that he does not drink alcohol.  Current Outpatient Medications   Medication Instructions   • amLODIPine-benazepril (LOTREL 5-10) 5-10 MG per capsule 80 capsules   • apixaban (ELIQUIS) 5 mg, 2 times daily   • chlorthalidone 25 mg, Daily   • cholecalciferol (VITAMIN D3) 1,000 Units, Daily   • famotidine (PEPCID) 20 mg tablet    • fexofenadine (ALLEGRA) 180 mg, Daily   • fluticasone (FLONASE) 50 mcg/act nasal spray    • gentamicin (GARAMYCIN) 0.1 % ointment    • HYDROcodone-acetaminophen (NORCO) 5-325 mg per tablet 1 tablet, Every 6 hours PRN   • ketoconazole (NIZORAL) 2 % cream    • metoprolol succinate (TOPROL-XL) 50 mg   • multivitamin (THERAGRAN) TABS 1 tablet, Daily   • nystatin-triamcinolone (MYCOLOG-II) cream 1 Application, 2 times daily   • omega-3-acid ethyl esters (Lovaza) 1 g capsule Take by mouth   • omeprazole (PRILOSEC) 20 mg, Daily   • oxyCODONE (ROXICODONE) 5 mg, Oral, Every 1 Minute as needed   • phenazopyridine (PYRIDIUM) 200 mg, Oral, 3 times daily with meals   • Probiotic Product (PROBIOTIC ADVANCED PO) Take by mouth   • rosuvastatin (CRESTOR) 20 mg, Daily   • spironolactone (ALDACTONE) 25 mg, Daily   • tadalafil (CIALIS) 10 mg, Oral, Daily PRN   • tamsulosin (FLOMAX) 0.4 mg   • triamcinolone (KENALOG) 0.1 % cream    • valsartan  "(DIOVAN) 80 mg, Oral   Allergies[4]   Medications Ordered Prior to Encounter[5]   Social History[6]     Objective  /70 (BP Location: Right arm, Patient Position: Sitting, Cuff Size: Standard)   Pulse 68   Ht 5' 8\" (1.727 m)   Wt 75.3 kg (166 lb)   BMI 25.24 kg/m²      Physical Exam  Constitutional:       General: He is not in acute distress.     Appearance: He is not ill-appearing or toxic-appearing.   HENT:      Head: Normocephalic and atraumatic.      Nose: Nose normal.      Mouth/Throat:      Mouth: Mucous membranes are moist.     Eyes:      Extraocular Movements: Extraocular movements intact.       Cardiovascular:      Rate and Rhythm: Normal rate and regular rhythm.      Heart sounds:      No friction rub. No gallop.      Comments: No pitting edema.  Pulmonary:      Effort: Pulmonary effort is normal. No respiratory distress.      Breath sounds: No wheezing or rales.   Abdominal:      General: There is no distension.      Palpations: Abdomen is soft.      Tenderness: There is no abdominal tenderness.     Neurological:      Mental Status: He is alert and oriented to person, place, and time.     Psychiatric:         Mood and Affect: Mood normal.         Behavior: Behavior normal.                [1] No past medical history on file.  [2] No past surgical history on file.  [3] No family history on file.  [4]   Allergies  Allergen Reactions   • Other Other (See Comments)     Renal insufficiency   • Tamsulosin Anaphylaxis     Headache and stuffiness   • Penicillins Other (See Comments)   [5]   Current Outpatient Medications on File Prior to Visit   Medication Sig Dispense Refill   • apixaban (ELIQUIS) 5 mg Take 5 mg by mouth in the morning and 5 mg in the evening.     • chlorthalidone 25 mg tablet Take 25 mg by mouth daily     • cholecalciferol (VITAMIN D3) 1,000 units tablet Take 1,000 Units by mouth in the morning.     • famotidine (PEPCID) 20 mg tablet      • fexofenadine (ALLEGRA) 180 MG tablet Take 180 " mg by mouth in the morning.     • fluticasone (FLONASE) 50 mcg/act nasal spray      • gentamicin (GARAMYCIN) 0.1 % ointment      • HYDROcodone-acetaminophen (NORCO) 5-325 mg per tablet Take 1 tablet by mouth every 6 (six) hours as needed for pain     • ketoconazole (NIZORAL) 2 % cream      • metoprolol succinate (TOPROL-XL) 50 mg 24 hr tablet Take 50 mg by mouth     • multivitamin (THERAGRAN) TABS Take 1 tablet by mouth in the morning.     • nystatin-triamcinolone (MYCOLOG-II) cream Apply 1 Application topically 2 (two) times a day     • omeprazole (PriLOSEC) 20 mg delayed release capsule Take 20 mg by mouth in the morning.     • Probiotic Product (PROBIOTIC ADVANCED PO) Take by mouth     • rosuvastatin (CRESTOR) 20 MG tablet Take 20 mg by mouth in the morning.     • tadalafil (CIALIS) 10 MG tablet Take 1 tablet (10 mg total) by mouth daily as needed for erectile dysfunction 30 tablet 3   • triamcinolone (KENALOG) 0.1 % cream      • amLODIPine-benazepril (LOTREL 5-10) 5-10 MG per capsule Take 80 capsules by mouth (Patient not taking: Reported on 7/21/2025)     • omega-3-acid ethyl esters (Lovaza) 1 g capsule Take by mouth (Patient not taking: Reported on 12/3/2021)     • oxyCODONE (ROXICODONE) 5 immediate release tablet Take 5 mg by mouth Q1MIN PRN     • phenazopyridine (PYRIDIUM) 200 mg tablet Take 1 tablet (200 mg total) by mouth 3 (three) times a day with meals (Patient not taking: Reported on 11/10/2021) 10 tablet 0   • spironolactone (ALDACTONE) 25 mg tablet Take 25 mg by mouth daily (Patient not taking: Reported on 7/21/2025)     • tamsulosin (FLOMAX) 0.4 mg Take 0.4 mg by mouth (Patient not taking: Reported on 7/26/2022)     • valsartan (Diovan) 40 mg tablet Take 80 mg by mouth       No current facility-administered medications on file prior to visit.   [6]   Social History  Tobacco Use   • Smoking status: Never   • Smokeless tobacco: Never   Vaping Use   • Vaping status: Never Used   Substance and Sexual  Activity   • Alcohol use: Never   • Drug use: Yes     Types: Marijuana

## 2025-07-21 NOTE — PROGRESS NOTES
Name: Bryan Meza      : 1955      MRN: [id number]  Encounter Provider: Marcelino Avendaño MD  Encounter Date: 2025   Encounter department: [facility] [place]  :  Assessment & Plan  Chronic kidney disease, unspecified CKD stage  Lab Results   Component Value Date    EGFR 19 (L) 2025    EGFR 19 (L) 05/15/2025    EGFR 15 (L) 2025    CREATININE 3.33 (H) 2025    CREATININE 3.33 (H) 05/15/2025    CREATININE 4.1 (H) 2025     The patient has chronic kidney disease and has been followed by a nephrologist for 15 years.  In terms of family history there was one young male relative who ended up on dialysis but unclear etiology.  He also was told a lot of his life by his mother that there was kidney disease in the family.  The patient has been monitored and appears over the last 5-6 years creatinine has been increasing.  When I look at things it does not appear that he has polycystic kidney disease by the comments on the CAT scans.  It does not seem that he has Alport syndrome as that was tested genetically by his other nephrologist and it was negative and again there is not a strong history of male relatives with end-stage renal disease.  There are some rare autosomal recessive interstitial kidney diseases but again I cannot definitively say he has that.  He is always had no significant proteinuria so unfortunately his creatinine has progressed and I really do not have much to offer in terms of insight into the exact etiology and it would just be a lot of discussion and theoretical causes.  On imaging he had lobulated kidneys but that could be normal variant.  He empties his bladder well and says that urology they checked this and he has no urine retention.    I did discuss with him that things have been monitored and again given lack of proteinuria I do not feel a kidney biopsy would help at this time or would have helped in the past as most aggressive intrinsic  diseases cause heavy proteinuria.  I did encourage him to consider transplant evaluation if he is interested.  His blood pressure was well-controlled he had no volume overload and felt well.      At this point he is going to continue to monitor and follow-up with his primary nephrologist.  His most recent labs showed a creatinine of 3.3 which is a baseline potassium was 6.2.  He says that the doctor stopped his spironolactone and will be repeating this and his primary nephrologist is addressing this.  Most recent urine protein to creatinine ratio was negligible as well.    I told him to call me if he has any questions in the future but again he is going to follow-up with his primary nephrologist regarding long-term care as well as recent labs.         I have spent a total time of 60 minutes in caring for this patient on the day of the visit/encounter including Diagnostic results, Patient and family education, Impressions, Reviewing/placing orders in the medical record (including tests, medications, and/or procedures), and Obtaining or reviewing history  .     History of Present Illness   HPI  Bryan Meza is a 69 y.o. male who presents for first visit.  The patient states he has been following with nephrology for 15 years and he has had progression and he really wanted to seek out another opinion to see if there was any other thing he could do to help slow the progression.  He looks well today reports no specific complaints and was a pleasure to meet with.  History obtained from: patient    Review of Systems   Constitutional:  Negative for appetite change, diaphoresis and fever.   HENT: Negative.     Eyes: Negative.    Respiratory:  Negative for cough, chest tightness and shortness of breath.    Cardiovascular:  Negative for chest pain and leg swelling.   Gastrointestinal:  Negative for abdominal pain, diarrhea, nausea and vomiting.   Genitourinary: Negative.         Empties bladder completely.  "  Psychiatric/Behavioral:  Negative for agitation, behavioral problems and confusion.      Medical History Reviewed by provider this encounter:     .  Past Medical History   Past Medical History[1] No history of diabetes, stroke, kidney stones, liver disease, lung disease.  Past Surgical History[2]  Family History[3]   reports that he has never smoked. He has never used smokeless tobacco. He reports current drug use. Drug: Marijuana. He reports that he does not drink alcohol.  Current Outpatient Medications   Medication Instructions    amLODIPine-benazepril (LOTREL 5-10) 5-10 MG per capsule 80 capsules    apixaban (ELIQUIS) 5 mg, 2 times daily    chlorthalidone 25 mg, Daily    cholecalciferol (VITAMIN D3) 1,000 Units, Daily    famotidine (PEPCID) 20 mg tablet     fexofenadine (ALLEGRA) 180 mg, Daily    fluticasone (FLONASE) 50 mcg/act nasal spray     gentamicin (GARAMYCIN) 0.1 % ointment     HYDROcodone-acetaminophen (NORCO) 5-325 mg per tablet 1 tablet, Every 6 hours PRN    ketoconazole (NIZORAL) 2 % cream     metoprolol succinate (TOPROL-XL) 50 mg    multivitamin (THERAGRAN) TABS 1 tablet, Daily    nystatin-triamcinolone (MYCOLOG-II) cream 1 Application, 2 times daily    omega-3-acid ethyl esters (Lovaza) 1 g capsule Take by mouth    omeprazole (PRILOSEC) 20 mg, Daily    oxyCODONE (ROXICODONE) 5 mg, Oral, Every 1 Minute as needed    phenazopyridine (PYRIDIUM) 200 mg, Oral, 3 times daily with meals    Probiotic Product (PROBIOTIC ADVANCED PO) Take by mouth    rosuvastatin (CRESTOR) 20 mg, Daily    spironolactone (ALDACTONE) 25 mg, Daily    tadalafil (CIALIS) 10 mg, Oral, Daily PRN    tamsulosin (FLOMAX) 0.4 mg    triamcinolone (KENALOG) 0.1 % cream     valsartan (DIOVAN) 80 mg, Oral   Allergies[4]   Medications Ordered Prior to Encounter[5]   Social History[6]     Objective   /70 (BP Location: Right arm, Patient Position: Sitting, Cuff Size: Standard)   Pulse 68   Ht 5' 8\" (1.727 m)   Wt 75.3 kg (166 lb)  "  BMI 25.24 kg/m²      Physical Exam  Constitutional:       General: He is not in acute distress.     Appearance: He is not ill-appearing or toxic-appearing.   HENT:      Head: Normocephalic and atraumatic.      Nose: Nose normal.      Mouth/Throat:      Mouth: Mucous membranes are moist.     Eyes:      Extraocular Movements: Extraocular movements intact.       Cardiovascular:      Rate and Rhythm: Normal rate and regular rhythm.      Heart sounds:      No friction rub. No gallop.      Comments: No pitting edema.  Pulmonary:      Effort: Pulmonary effort is normal. No respiratory distress.      Breath sounds: No wheezing or rales.   Abdominal:      General: There is no distension.      Palpations: Abdomen is soft.      Tenderness: There is no abdominal tenderness.     Neurological:      Mental Status: He is alert and oriented to person, place, and time.     Psychiatric:         Mood and Affect: Mood normal.         Behavior: Behavior normal.                  [1] No past medical history on file.  [2] No past surgical history on file.  [3] No family history on file.  [4]   Allergies  Allergen Reactions    Other Other (See Comments)     Renal insufficiency    Tamsulosin Anaphylaxis     Headache and stuffiness    Penicillins Other (See Comments)   [5]   Current Outpatient Medications on File Prior to Visit   Medication Sig Dispense Refill    apixaban (ELIQUIS) 5 mg Take 5 mg by mouth in the morning and 5 mg in the evening.      chlorthalidone 25 mg tablet Take 25 mg by mouth daily      cholecalciferol (VITAMIN D3) 1,000 units tablet Take 1,000 Units by mouth in the morning.      famotidine (PEPCID) 20 mg tablet       fexofenadine (ALLEGRA) 180 MG tablet Take 180 mg by mouth in the morning.      fluticasone (FLONASE) 50 mcg/act nasal spray       gentamicin (GARAMYCIN) 0.1 % ointment       HYDROcodone-acetaminophen (NORCO) 5-325 mg per tablet Take 1 tablet by mouth every 6 (six) hours as needed for pain      ketoconazole  (NIZORAL) 2 % cream       metoprolol succinate (TOPROL-XL) 50 mg 24 hr tablet Take 50 mg by mouth      multivitamin (THERAGRAN) TABS Take 1 tablet by mouth in the morning.      nystatin-triamcinolone (MYCOLOG-II) cream Apply 1 Application topically 2 (two) times a day      omeprazole (PriLOSEC) 20 mg delayed release capsule Take 20 mg by mouth in the morning.      Probiotic Product (PROBIOTIC ADVANCED PO) Take by mouth      rosuvastatin (CRESTOR) 20 MG tablet Take 20 mg by mouth in the morning.      tadalafil (CIALIS) 10 MG tablet Take 1 tablet (10 mg total) by mouth daily as needed for erectile dysfunction 30 tablet 3    triamcinolone (KENALOG) 0.1 % cream       amLODIPine-benazepril (LOTREL 5-10) 5-10 MG per capsule Take 80 capsules by mouth (Patient not taking: Reported on 7/21/2025)      omega-3-acid ethyl esters (Lovaza) 1 g capsule Take by mouth (Patient not taking: Reported on 12/3/2021)      oxyCODONE (ROXICODONE) 5 immediate release tablet Take 5 mg by mouth Q1MIN PRN      phenazopyridine (PYRIDIUM) 200 mg tablet Take 1 tablet (200 mg total) by mouth 3 (three) times a day with meals (Patient not taking: Reported on 11/10/2021) 10 tablet 0    spironolactone (ALDACTONE) 25 mg tablet Take 25 mg by mouth daily (Patient not taking: Reported on 7/21/2025)      tamsulosin (FLOMAX) 0.4 mg Take 0.4 mg by mouth (Patient not taking: Reported on 7/26/2022)      valsartan (Diovan) 40 mg tablet Take 80 mg by mouth       No current facility-administered medications on file prior to visit.   [6]   Social History  Tobacco Use    Smoking status: Never    Smokeless tobacco: Never   Vaping Use    Vaping status: Never Used   Substance and Sexual Activity    Alcohol use: Never    Drug use: Yes     Types: Marijuana

## 2025-07-21 NOTE — ASSESSMENT & PLAN NOTE
Lab Results   Component Value Date    EGFR 19 (L) 07/11/2025    EGFR 19 (L) 05/15/2025    EGFR 15 (L) 05/09/2025    CREATININE 3.33 (H) 07/11/2025    CREATININE 3.33 (H) 05/15/2025    CREATININE 4.1 (H) 05/09/2025     The patient has chronic kidney disease and has been followed by a nephrologist for 15 years.  In terms of family history there was one young male relative who ended up on dialysis but unclear etiology.  He also was told a lot of his life by his mother that there was kidney disease in the family.  The patient has been monitored and appears over the last 5-6 years creatinine has been increasing.  When I look at things it does not appear that he has polycystic kidney disease by the comments on the CAT scans.  It does not seem that he has Alport syndrome as that was tested genetically by his other nephrologist and it was negative and again there is not a strong history of male relatives with end-stage renal disease.  There are some rare autosomal recessive interstitial kidney diseases but again I cannot definitively say he has that.  He is always had no significant proteinuria so unfortunately his creatinine has progressed and I really do not have much to offer in terms of insight into the exact etiology and it would just be a lot of discussion and theoretical causes.  On imaging he had lobulated kidneys but that could be normal variant.  He empties his bladder well and says that urology they checked this and he has no urine retention.    I did discuss with him that things have been monitored and again given lack of proteinuria I do not feel a kidney biopsy would help at this time or would have helped in the past as most aggressive intrinsic diseases cause heavy proteinuria.  I did encourage him to consider transplant evaluation if he is interested.  His blood pressure was well-controlled he had no volume overload and felt well.      At this point he is going to continue to monitor and follow-up with his  primary nephrologist.  His most recent labs showed a creatinine of 3.3 which is a baseline potassium was 6.2.  He says that the doctor stopped his spironolactone and will be repeating this and his primary nephrologist is addressing this.  Most recent urine protein to creatinine ratio was negligible as well.    I told him to call me if he has any questions in the future but again he is going to follow-up with his primary nephrologist regarding long-term care as well as recent labs.

## 2025-07-21 NOTE — PATIENT INSTRUCTIONS
You are here as you stated for second opinion just to see if there is anything to do is you been following a nephrologist for at least 15 years and recently the blood tests have gotten worse.  As I told you when I look at things you do not have a lot of protein or any protein in the urine as most aggressive kidneys have that so I do not think there is really any other additional things such as a kidney biopsy that would  and just focus on the routine good health management you are doing you will follow-up with your primary nephrologist if you ever have questions you can reach out and you told me that I will be following up on your potassium levels and kidney function.    If things stay at this level I do think it is a good idea to consider getting involved with transplant if that something you would want to do for the reasons we discussed.